# Patient Record
Sex: MALE | Race: WHITE | NOT HISPANIC OR LATINO
[De-identification: names, ages, dates, MRNs, and addresses within clinical notes are randomized per-mention and may not be internally consistent; named-entity substitution may affect disease eponyms.]

---

## 2017-06-10 PROBLEM — Z00.00 ENCOUNTER FOR PREVENTIVE HEALTH EXAMINATION: Status: ACTIVE | Noted: 2017-06-10

## 2017-07-03 ENCOUNTER — APPOINTMENT (OUTPATIENT)
Dept: UROLOGY | Facility: CLINIC | Age: 32
End: 2017-07-03

## 2020-02-03 ENCOUNTER — APPOINTMENT (OUTPATIENT)
Dept: OTOLARYNGOLOGY | Facility: CLINIC | Age: 35
End: 2020-02-03
Payer: MEDICAID

## 2020-02-03 VITALS
HEIGHT: 73 IN | WEIGHT: 220 LBS | HEART RATE: 76 BPM | DIASTOLIC BLOOD PRESSURE: 83 MMHG | BODY MASS INDEX: 29.16 KG/M2 | SYSTOLIC BLOOD PRESSURE: 119 MMHG

## 2020-02-03 DIAGNOSIS — Z84.89 FAMILY HISTORY OF OTHER SPECIFIED CONDITIONS: ICD-10-CM

## 2020-02-03 DIAGNOSIS — Z80.9 FAMILY HISTORY OF MALIGNANT NEOPLASM, UNSPECIFIED: ICD-10-CM

## 2020-02-03 DIAGNOSIS — Z83.3 FAMILY HISTORY OF DIABETES MELLITUS: ICD-10-CM

## 2020-02-03 DIAGNOSIS — Z82.3 FAMILY HISTORY OF STROKE: ICD-10-CM

## 2020-02-03 DIAGNOSIS — Z82.2 FAMILY HISTORY OF DEAFNESS AND HEARING LOSS: ICD-10-CM

## 2020-02-03 DIAGNOSIS — Z83.79 FAMILY HISTORY OF OTHER DISEASES OF THE DIGESTIVE SYSTEM: ICD-10-CM

## 2020-02-03 DIAGNOSIS — F17.200 NICOTINE DEPENDENCE, UNSPECIFIED, UNCOMPLICATED: ICD-10-CM

## 2020-02-03 DIAGNOSIS — Z82.61 FAMILY HISTORY OF ARTHRITIS: ICD-10-CM

## 2020-02-03 DIAGNOSIS — Z83.2 FAMILY HISTORY OF DISEASES OF THE BLOOD AND BLOOD-FORMING ORGANS AND CERTAIN DISORDERS INVOLVING THE IMMUNE MECHANISM: ICD-10-CM

## 2020-02-03 PROCEDURE — 31231 NASAL ENDOSCOPY DX: CPT

## 2020-02-03 PROCEDURE — 99204 OFFICE O/P NEW MOD 45 MIN: CPT | Mod: 25

## 2020-02-03 PROCEDURE — 92557 COMPREHENSIVE HEARING TEST: CPT

## 2020-02-03 PROCEDURE — 92567 TYMPANOMETRY: CPT

## 2020-02-03 PROCEDURE — 92563 TONE DECAY HEARING TEST: CPT

## 2020-02-03 RX ORDER — IBUPROFEN 100 MG
100 TABLET,CHEWABLE ORAL
Refills: 0 | Status: ACTIVE | COMMUNITY

## 2020-02-03 RX ORDER — METOPROLOL TARTRATE 75 MG/1
TABLET, FILM COATED ORAL
Refills: 0 | Status: ACTIVE | COMMUNITY

## 2020-02-03 RX ORDER — AMOXICILLIN AND CLAVULANATE POTASSIUM 875; 125 MG/1; MG/1
875-125 TABLET, COATED ORAL
Qty: 20 | Refills: 0 | Status: ACTIVE | COMMUNITY
Start: 2020-02-03 | End: 1900-01-01

## 2020-02-03 RX ORDER — PREDNISONE 10 MG/1
10 TABLET ORAL
Qty: 45 | Refills: 0 | Status: ACTIVE | COMMUNITY
Start: 2020-02-03 | End: 1900-01-01

## 2020-02-03 RX ORDER — CLONAZEPAM 0.5 MG/1
0.5 TABLET ORAL
Refills: 0 | Status: ACTIVE | COMMUNITY

## 2020-02-03 RX ORDER — CETIRIZINE HCL 5 MG
5 TABLET ORAL
Refills: 0 | Status: ACTIVE | COMMUNITY

## 2020-02-03 NOTE — CONSULT LETTER
[Dear  ___] : Dear  [unfilled], [Consult Letter:] : I had the pleasure of evaluating your patient, [unfilled]. [Please see my note below.] : Please see my note below. [Consult Closing:] : Thank you very much for allowing me to participate in the care of this patient.  If you have any questions, please do not hesitate to contact me. [FreeTextEntry3] : Sincerely,\par \par Jd Chaudhary MD., FACS\par

## 2020-02-03 NOTE — ASSESSMENT
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  Recommended starting predinisone - all r and a discussed.  Also will get MRI - could not order with contrast due to allergy to contrast material.\par Also has hx of sinus issues and noted to have dns to left and left nasal polyps. Feel prednisone will also help nasal polyps and will also start augmentin.  Will get CT of sinuses to further evaluate nasal polyps and pending result may consider further eval with Dr. Michaels.  \par Re-eval in 3 weeks and as necessary.

## 2020-02-03 NOTE — HISTORY OF PRESENT ILLNESS
[de-identified] : For past 2 mos notes noise in left ear.  High pitched 24/7 - not pulsatile.  No noted change in hearing.  No TM perf or drainage.  ? sl lightheaded for past  2 mos also.  \par Also recently noted nasal congestion and occ yellow nasal discharge. No fever.  Problems bilateral. Does have hx of allergies as child. Occ some headaches.  Hx of sinusitis in past - 3 infections in past year.  \par Smokes at least 1/2 ppd for past 15 years.

## 2020-02-03 NOTE — REVIEW OF SYSTEMS
[Sneezing] : sneezing [Seasonal Allergies] : seasonal allergies [Post Nasal Drip] : post nasal drip [Ear Pain] : ear pain [Ear Itch] : ear itch [Hearing Loss] : hearing loss [Dizziness] : dizziness [Vertigo] : vertigo [Ear Drainage] : ear drainage [Ear Noises] : ear noises [Lightheadedness] : lightheadedness [Nasal Congestion] : nasal congestion [Recurrent Sinus Infections] : recurrent sinus infections [Sinus Pain] : sinus pain [Sinus Pressure] : sinus pressure [Sense Of Smell Problem] : sense of smell problem [Discolored Nasal Discharge] : discolored nasal discharge [Hoarseness] : hoarseness [Throat Clearing] : throat clearing [Throat Dryness] : throat dryness [Throat Itching] : throat itching [Chills] : chills [Feeling Tired] : feeling tired [Eye Pain] : eye pain [Eyes Itch] : itching of the eyes [Chest Pain] : chest pain [Palpitations] : palpitations [Shortness Of Breath] : shortness of breath [Wheezing] : wheezing [Cough] : cough [Joint Pain] : joint pain [Itching] : itching [Anxiety] : anxiety [Depression] : depression [Muscle Weakness] : muscle weakness [Swollen Glands] : swollen glands [Negative] : Neurological [Patient Intake Form Reviewed] : Patient intake form was reviewed [FreeTextEntry3] : watery eyes [FreeTextEntry6] : noisy breathing [FreeTextEntry1] : headache, daytime sleepiness, rash, hives, skin/hair changes

## 2020-02-09 ENCOUNTER — FORM ENCOUNTER (OUTPATIENT)
Age: 35
End: 2020-02-09

## 2020-02-10 ENCOUNTER — APPOINTMENT (OUTPATIENT)
Dept: MRI IMAGING | Facility: CLINIC | Age: 35
End: 2020-02-10
Payer: MEDICAID

## 2020-02-10 ENCOUNTER — OUTPATIENT (OUTPATIENT)
Dept: OUTPATIENT SERVICES | Facility: HOSPITAL | Age: 35
LOS: 1 days | End: 2020-02-10
Payer: MEDICAID

## 2020-02-10 ENCOUNTER — APPOINTMENT (OUTPATIENT)
Dept: CT IMAGING | Facility: CLINIC | Age: 35
End: 2020-02-10
Payer: MEDICAID

## 2020-02-10 DIAGNOSIS — H90.5 UNSPECIFIED SENSORINEURAL HEARING LOSS: ICD-10-CM

## 2020-02-10 DIAGNOSIS — J32.0 CHRONIC MAXILLARY SINUSITIS: ICD-10-CM

## 2020-02-10 DIAGNOSIS — J33.9 NASAL POLYP, UNSPECIFIED: ICD-10-CM

## 2020-02-10 PROCEDURE — 70551 MRI BRAIN STEM W/O DYE: CPT | Mod: 26

## 2020-02-10 PROCEDURE — 70486 CT MAXILLOFACIAL W/O DYE: CPT | Mod: 26

## 2020-02-10 PROCEDURE — 70551 MRI BRAIN STEM W/O DYE: CPT

## 2020-02-10 PROCEDURE — 70486 CT MAXILLOFACIAL W/O DYE: CPT

## 2020-03-04 ENCOUNTER — APPOINTMENT (OUTPATIENT)
Dept: OTOLARYNGOLOGY | Facility: CLINIC | Age: 35
End: 2020-03-04
Payer: MEDICAID

## 2020-03-04 VITALS — WEIGHT: 220 LBS | HEIGHT: 73 IN | BODY MASS INDEX: 29.16 KG/M2

## 2020-03-04 DIAGNOSIS — H90.3 SENSORINEURAL HEARING LOSS, BILATERAL: ICD-10-CM

## 2020-03-04 PROCEDURE — 92567 TYMPANOMETRY: CPT

## 2020-03-04 PROCEDURE — 92557 COMPREHENSIVE HEARING TEST: CPT

## 2020-03-04 PROCEDURE — 99214 OFFICE O/P EST MOD 30 MIN: CPT | Mod: 25

## 2020-03-04 NOTE — DATA REVIEWED
[de-identified] : I personally reviewed the patient's audiogram, which shows mostly stable asymmetric HF L SNHL.\par  [de-identified] : I personally reviewed MRI images and the report, which shows no internal auditory canal or retrocochlear pathology bilaterally.\par

## 2020-03-04 NOTE — HISTORY OF PRESENT ILLNESS
[de-identified] : 34M left tinnitus that began before the holidays in December.  Last audio around the age of 19-20.  Does not remember exact date tinnitus worsened.  Does not recall URI symptoms at time of onset.  Has not noticed significant change in hearing.  Noticed tinnitus within 3 weeks of returning from [place] on Thanksgiving.  No dizziness/vertigo.  Takes klonopin for anxiety/PTSD.

## 2020-03-04 NOTE — PHYSICAL EXAM
[Normal] : no rashes [FreeTextEntry2] : Facial nerve function is House Brackmann 1/6 in right ear and 1/6 in left ear.

## 2020-03-09 ENCOUNTER — APPOINTMENT (OUTPATIENT)
Dept: OTOLARYNGOLOGY | Facility: CLINIC | Age: 35
End: 2020-03-09
Payer: MEDICAID

## 2020-03-09 VITALS
DIASTOLIC BLOOD PRESSURE: 72 MMHG | WEIGHT: 230 LBS | SYSTOLIC BLOOD PRESSURE: 108 MMHG | HEART RATE: 71 BPM | BODY MASS INDEX: 30.48 KG/M2 | HEIGHT: 73 IN

## 2020-03-09 DIAGNOSIS — J33.9 NASAL POLYP, UNSPECIFIED: ICD-10-CM

## 2020-03-09 DIAGNOSIS — H90.5 UNSPECIFIED SENSORINEURAL HEARING LOSS: ICD-10-CM

## 2020-03-09 DIAGNOSIS — H93.12 TINNITUS, LEFT EAR: ICD-10-CM

## 2020-03-09 DIAGNOSIS — J32.0 CHRONIC MAXILLARY SINUSITIS: ICD-10-CM

## 2020-03-09 DIAGNOSIS — J34.2 DEVIATED NASAL SEPTUM: ICD-10-CM

## 2020-03-09 PROCEDURE — 31231 NASAL ENDOSCOPY DX: CPT

## 2020-03-09 PROCEDURE — 99213 OFFICE O/P EST LOW 20 MIN: CPT | Mod: 25

## 2020-03-09 NOTE — ASSESSMENT
[FreeTextEntry1] : Patient here for follow up of nasal polyps.  Ct reviewed with patient.  Exam does show still significant nasal polyposis with dns to left.  Recommended eval with Dr. Michaels for consideration of possible surgical intervention.  Patient will also continue to be followed for ear issues with Dr Colon.

## 2020-03-09 NOTE — HISTORY OF PRESENT ILLNESS
[de-identified] : Patient with asymmetric loss and had discussion with Dr. Colon regarding IT injection- not done.  \par Here today for sinus issues.  c/o occ nasal congestion .  No hx of freq sinus problems.  Here for follow up

## 2020-06-10 ENCOUNTER — APPOINTMENT (OUTPATIENT)
Dept: OTOLARYNGOLOGY | Facility: CLINIC | Age: 35
End: 2020-06-10

## 2020-06-27 ENCOUNTER — RX RENEWAL (OUTPATIENT)
Age: 35
End: 2020-06-27

## 2020-06-27 RX ORDER — FLUTICASONE PROPIONATE 50 UG/1
50 SPRAY, METERED NASAL TWICE DAILY
Qty: 16 | Refills: 4 | Status: ACTIVE | COMMUNITY
Start: 2020-02-03 | End: 1900-01-01

## 2020-08-27 ENCOUNTER — EMERGENCY (EMERGENCY)
Facility: HOSPITAL | Age: 35
LOS: 1 days | Discharge: HOME | End: 2020-08-27
Attending: EMERGENCY MEDICINE
Payer: COMMERCIAL

## 2020-08-27 VITALS
HEART RATE: 89 BPM | SYSTOLIC BLOOD PRESSURE: 128 MMHG | DIASTOLIC BLOOD PRESSURE: 79 MMHG | OXYGEN SATURATION: 98 % | RESPIRATION RATE: 18 BRPM

## 2020-08-27 VITALS
HEIGHT: 73 IN | OXYGEN SATURATION: 99 % | DIASTOLIC BLOOD PRESSURE: 93 MMHG | HEART RATE: 92 BPM | RESPIRATION RATE: 18 BRPM | TEMPERATURE: 98 F | WEIGHT: 195.11 LBS | SYSTOLIC BLOOD PRESSURE: 133 MMHG

## 2020-08-27 PROCEDURE — 70450 CT HEAD/BRAIN W/O DYE: CPT

## 2020-08-27 PROCEDURE — 99285 EMERGENCY DEPT VISIT HI MDM: CPT

## 2020-08-27 PROCEDURE — 72125 CT NECK SPINE W/O DYE: CPT | Mod: 26

## 2020-08-27 PROCEDURE — 72125 CT NECK SPINE W/O DYE: CPT

## 2020-08-27 PROCEDURE — 70450 CT HEAD/BRAIN W/O DYE: CPT | Mod: 26

## 2020-08-27 PROCEDURE — 99284 EMERGENCY DEPT VISIT MOD MDM: CPT | Mod: 25

## 2020-08-27 RX ORDER — SODIUM CHLORIDE 9 MG/ML
1000 INJECTION INTRAMUSCULAR; INTRAVENOUS; SUBCUTANEOUS ONCE
Refills: 0 | Status: COMPLETED | OUTPATIENT
Start: 2020-08-27 | End: 2020-08-27

## 2020-08-27 RX ORDER — ALPRAZOLAM 0.25 MG
0.25 TABLET ORAL ONCE
Refills: 0 | Status: DISCONTINUED | OUTPATIENT
Start: 2020-08-27 | End: 2020-08-27

## 2020-08-27 RX ORDER — MECLIZINE HCL 12.5 MG
50 TABLET ORAL ONCE
Refills: 0 | Status: COMPLETED | OUTPATIENT
Start: 2020-08-27 | End: 2020-08-27

## 2020-08-27 RX ORDER — MECLIZINE HCL 12.5 MG
1 TABLET ORAL
Qty: 9 | Refills: 0
Start: 2020-08-27 | End: 2020-08-29

## 2020-08-27 RX ORDER — METOCLOPRAMIDE HCL 10 MG
10 TABLET ORAL ONCE
Refills: 0 | Status: COMPLETED | OUTPATIENT
Start: 2020-08-27 | End: 2020-08-27

## 2020-08-27 RX ADMIN — SODIUM CHLORIDE 1000 MILLILITER(S): 9 INJECTION INTRAMUSCULAR; INTRAVENOUS; SUBCUTANEOUS at 15:30

## 2020-08-27 RX ADMIN — Medication 50 MILLIGRAM(S): at 16:53

## 2020-08-27 RX ADMIN — Medication 0.25 MILLIGRAM(S): at 16:53

## 2020-08-27 RX ADMIN — Medication 10 MILLIGRAM(S): at 15:30

## 2020-08-27 NOTE — ED PROVIDER NOTE - PATIENT PORTAL LINK FT
You can access the FollowMyHealth Patient Portal offered by [facility] by registering at the following website: [email or web address]. By joining [facility]’s FollowMyHealth portal, you will also be able to view your health information using other applications (apps) compatible with our system.

## 2020-08-27 NOTE — ED PROVIDER NOTE - PROGRESS NOTE DETAILS
Lee SANDOVAL: CTH negative, pt has a history of vertigo, states he feels vertiginous. Has mild horizontal nystagmus on L gaze. Will order meclizine and xanax.
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
## 2020-08-27 NOTE — ED PROVIDER NOTE - CARE PROVIDER_API CALL
Francisco Olguin  NEUROLOGY  [address] [place] [address]  Phone: [phone]  Fax: [phone]  Follow Up Time: 4-6 Days

## 2022-06-27 ENCOUNTER — OFFICE (OUTPATIENT)
Dept: URBAN - METROPOLITAN AREA CLINIC 1 | Facility: CLINIC | Age: 37
Setting detail: OPHTHALMOLOGY
End: 2022-06-27
Payer: MEDICAID

## 2022-06-27 ENCOUNTER — RX ONLY (RX ONLY)
Age: 37
End: 2022-06-27

## 2022-06-27 DIAGNOSIS — H01.002: ICD-10-CM

## 2022-06-27 DIAGNOSIS — H01.004: ICD-10-CM

## 2022-06-27 DIAGNOSIS — H01.005: ICD-10-CM

## 2022-06-27 DIAGNOSIS — H16.223: ICD-10-CM

## 2022-06-27 DIAGNOSIS — H01.001: ICD-10-CM

## 2022-06-27 PROCEDURE — 92004 COMPRE OPH EXAM NEW PT 1/>: CPT | Performed by: OPHTHALMOLOGY

## 2022-06-27 ASSESSMENT — KERATOMETRY
OS_K2POWER_DIOPTERS: 47.50
OD_K1POWER_DIOPTERS: 44.00
OS_AXISANGLE_DEGREES: 81
OD_AXISANGLE_DEGREES: 97
OS_K1POWER_DIOPTERS: 44.00
OD_K2POWER_DIOPTERS: 46.50

## 2022-06-27 ASSESSMENT — REFRACTION_MANIFEST
OS_CYLINDER: -3.50
OS_VA1: 20/20-1
OD_CYLINDER: -2.00
OS_AXIS: 170
OD_AXIS: 044
OD_VA1: 20/20
OD_SPHERE: +0.75
OS_SPHERE: +1.25

## 2022-06-27 ASSESSMENT — AXIALLENGTH_DERIVED
OD_AL: 23.0588
OS_AL: 22.9767
OD_AL: 23.0588
OS_AL: 22.9767

## 2022-06-27 ASSESSMENT — REFRACTION_AUTOREFRACTION
OD_CYLINDER: -2.00
OS_CYLINDER: -3.50
OS_SPHERE: +1.25
OD_AXIS: 044
OS_AXIS: 170
OD_SPHERE: +0.75

## 2022-06-27 ASSESSMENT — REFRACTION_CURRENTRX
OS_AXIS: 175
OD_AXIS: 17
OS_SPHERE: PLANO
OS_CYLINDER: -3.00
OD_VPRISM_DIRECTION: SV
OS_VPRISM_DIRECTION: SV
OD_SPHERE: -0.25
OD_CYLINDER: -1.75
OD_OVR_VA: 20/
OS_OVR_VA: 20/

## 2022-06-27 ASSESSMENT — VISUAL ACUITY
OD_BCVA: 20/25-1
OS_BCVA: 20/20

## 2022-06-27 ASSESSMENT — CONFRONTATIONAL VISUAL FIELD TEST (CVF)
OD_FINDINGS: FULL
OS_FINDINGS: FULL

## 2022-06-27 ASSESSMENT — SPHEQUIV_DERIVED
OS_SPHEQUIV: -0.5
OS_SPHEQUIV: -0.5
OD_SPHEQUIV: -0.25
OD_SPHEQUIV: -0.25

## 2022-06-27 ASSESSMENT — TONOMETRY
OS_IOP_MMHG: 12
OD_IOP_MMHG: 14

## 2022-06-27 ASSESSMENT — DECREASING TEAR LAKE - SEVERITY SCORE
OS_DEC_TEARLAKE: T
OD_DEC_TEARLAKE: T

## 2022-06-27 ASSESSMENT — LID EXAM ASSESSMENTS
OS_BLEPHARITIS: LLL LUL
OD_BLEPHARITIS: RLL RUL

## 2023-06-28 ENCOUNTER — OFFICE (OUTPATIENT)
Dept: URBAN - METROPOLITAN AREA CLINIC 105 | Facility: CLINIC | Age: 38
Setting detail: OPHTHALMOLOGY
End: 2023-06-28
Payer: MEDICAID

## 2023-06-28 DIAGNOSIS — H90.3: ICD-10-CM

## 2023-06-28 PROCEDURE — V5261 HEARING AID, DIGIT, BIN, BTE: HCPCS | Performed by: AUDIOLOGIST-HEARING AID FITTER

## 2023-08-10 ENCOUNTER — OFFICE (OUTPATIENT)
Dept: URBAN - METROPOLITAN AREA CLINIC 105 | Facility: CLINIC | Age: 38
Setting detail: OPHTHALMOLOGY
End: 2023-08-10

## 2023-08-10 DIAGNOSIS — H90.3: ICD-10-CM

## 2023-08-10 PROCEDURE — HAD HEARING AID DISPENSE: Performed by: AUDIOLOGIST-HEARING AID FITTER
